# Patient Record
Sex: FEMALE | Race: BLACK OR AFRICAN AMERICAN | URBAN - METROPOLITAN AREA
[De-identification: names, ages, dates, MRNs, and addresses within clinical notes are randomized per-mention and may not be internally consistent; named-entity substitution may affect disease eponyms.]

---

## 2022-06-21 RX ORDER — INSULIN ASPART 100 [IU]/ML
INJECTION, SOLUTION INTRAVENOUS; SUBCUTANEOUS
COMMUNITY

## 2022-06-21 RX ORDER — FLUTICASONE PROPIONATE 50 MCG
SPRAY, SUSPENSION (ML) NASAL
COMMUNITY

## 2022-06-21 RX ORDER — POTASSIUM CHLORIDE 600 MG/1
TABLET, FILM COATED, EXTENDED RELEASE ORAL
COMMUNITY

## 2022-06-21 RX ORDER — TACROLIMUS 0.5 MG/1
CAPSULE ORAL
COMMUNITY

## 2022-06-21 RX ORDER — ROSUVASTATIN CALCIUM 40 MG/1
TABLET, COATED ORAL
COMMUNITY

## 2022-06-21 RX ORDER — AZELASTINE 1 MG/ML
SPRAY, METERED NASAL
COMMUNITY
Start: 2020-10-27

## 2022-06-21 RX ORDER — PANTOPRAZOLE SODIUM 40 MG/1
TABLET, DELAYED RELEASE ORAL
COMMUNITY
End: 2022-07-27

## 2022-06-21 RX ORDER — AMLODIPINE BESYLATE 10 MG/1
TABLET ORAL
COMMUNITY

## 2022-06-21 RX ORDER — MYCOPHENOLATE MOFETIL 250 MG/1
CAPSULE ORAL
COMMUNITY

## 2022-06-21 RX ORDER — TACROLIMUS 1 MG/1
1 CAPSULE ORAL
COMMUNITY

## 2022-06-21 RX ORDER — CARVEDILOL 3.12 MG/1
TABLET ORAL
COMMUNITY

## 2022-06-21 RX ORDER — LISINOPRIL 10 MG/1
TABLET ORAL
COMMUNITY

## 2022-06-21 RX ORDER — TRAMADOL HYDROCHLORIDE 50 MG/1
TABLET ORAL
COMMUNITY
Start: 2021-09-02

## 2022-06-21 RX ORDER — INSULIN LISPRO 100 [IU]/ML
INJECTION, SOLUTION INTRAVENOUS; SUBCUTANEOUS
COMMUNITY

## 2022-07-22 NOTE — PROGRESS NOTES
Mainor Williamson MD   Bariatric & Advanced Laparoscopic Surgery & Endoscopy  [facility], [address] [facility]  Mal[place] Harmon                                     Office [phone] Fax [phone]        Date of visit: 7/27/2022      History and Physical    Patient: Delores Bliss MRN: [id number]     YOB: 1955  Age: 61 y.o. Sex: female              PCP: No primary care provider on file. Date:  7/27/2022      Delores Bliss  who presents to the [facility] for consideration of bariatric surgery. This is her initial consultation preparing her for our multi-disciplinary surgical preparatory regimen. She presents with a height of 5' 5\" (1.651 m) and weight of 284 lb (128.8 kg), giving her a Body mass index is 47.26 kg/m². She has an Ideal body weight of 150 lbs, and excess body weight of 134 lbs. BARIATRIC PROCEDURE OF INTEREST: laparoscopic sleeve gastrectomy    30 day Bariatric Surgical Risk Percentage: 27.62%, 46.08%    MEDICAL HISTORY:  Morbid Obesity  A-fib, on anticoagulation  Hx of kidney transplant in 2020 at [address] - she has gained 80 lbs since her kidney transplant surgery. Hx of blood transfusion  Pacemaker    Comorbidity Yes or No   Hypertension- how many medications = 2 Yes   Hyperlipidemia No   Diabetes Mellitus  Insulin dependent = Yes  Last A1c = 7.1 Yes   Coronary Artery Disease Yes   Gastroesophageal Reflux No   Obstructive Sleep Apnea Yes   Cancer No   Asthma No   Osteoarthritis Yes   Joint Pain Yes     Admits to GERD symptoms historically, but has resolved recently due to changing her diet and is no longer on medication. Denies dysphagia or previous EGD. Other Yes or No   Venous Stasis No   Dialysis Yes, historically   Long term use of steroid or immunocompromised conditions Yes   On Anticoagulants Yes       DENTAL/CHEWING ABILITY:  No dental issues with chewing.      PRIOR WEIGHT LOSS ATTEMPTS:  Reports 4-10 previous weight loss attempts including OTC medication and low calorie diet and exercise. EXERCISE ASSESSMENT:  Reports current exercise regimen of going to the gym a couple times a week. Reviewed NIH guidelines. PSYCHOSOCIAL:  She notes she is single and states main support person is her friend, Tiffanie Liu. She is employed as a supervisor at [email or web address]. Her goal in pursuing surgical weight loss is to improve health. She denies any active psychological problems and denies history of depression/anxiety. She states she is independent in her care, can drive a car, and can ambulate without assistance. HISTORY:  No past medical history on file. She denies personal or family history of problems with anesthesia, bleeding, or clotting. She denies history of DVT or pulmonary embolism. She denies dysphagia. No past surgical history on file. No family history on file. MEDICATIONS:  Current Outpatient Medications   Medication Sig Dispense Refill    cetirizine (ZYRTEC) 10 MG tablet cetirizine 10 mg tablet   Take 1 tablet every day by oral route.       TRULICITY 1.5 ZD/8.2CS SOPN       JARDIANCE 10 MG tablet       furosemide (LASIX) 40 MG tablet TAKE 1 TABLET BY MOUTH TWICE DAILY      gabapentin (NEURONTIN) 300 MG capsule TAKE 1 CAPSULE BY MOUTH DAILY FOR 1 DAY      BAQSIMI ONE PACK 3 MG/DOSE POWD       NOVOLIN N FLEXPEN 100 UNIT/ML injection pen       B-D ULTRAFINE III SHORT PEN 31G X 8 MM MISC       itraconazole (SPORANOX) 100 MG capsule       potassium chloride (KLOR-CON M) 20 MEQ extended release tablet       predniSONE (DELTASONE) 5 MG tablet       NOVOLOG FLEXPEN 100 UNIT/ML injection pen       amLODIPine (NORVASC) 10 MG tablet TAKE ONE TABLET BY MOUTH EVERY DAY Orally Once a day      apixaban (ELIQUIS) 5 MG TABS tablet as directed Orally Twice a day      azelastine (ASTELIN) 0.1 % nasal spray 1 puff in each nostril Nasally Twice a day for 30 day(s)      carvedilol (COREG) 3.125 MG tablet 1 tablet Orally Twice a day      fluticasone (FLONASE) 50 MCG/ACT nasal spray 2 spray in each nostril Nasally as needed      insulin aspart (NOVOLOG) 100 UNIT/ML injection vial as directed Subcutaneous 8 units breakfast, lunch dinner for 90 days      insulin lispro (HUMALOG) 100 UNIT/ML SOLN injection vial as directed Subcutaneous 22 units QAM 8 units QPM for 90 days      lisinopril (PRINIVIL;ZESTRIL) 10 MG tablet 1 tablet Orally Once a day for 30 day(s)      mycophenolate (CELLCEPT) 250 MG capsule 2 capsules Orally Twice a day      potassium chloride (KLOR-CON) 8 MEQ extended release tablet 2 tablets with food Orally Twice a day for 30 day(s)      rosuvastatin (CRESTOR) 40 MG tablet TAKE ONE TABLET BY MOUTH ONCE DAILY. for 30 days      tacrolimus (PROGRAF) 0.5 MG capsule 2 capsules Orally Once a day      tacrolimus (PROGRAF) 1 MG capsule 1 capsule      traMADol (ULTRAM) 50 MG tablet 1 tablet as needed Orally every 6 hrs       No current facility-administered medications for this visit. ALLERGIES:  No Known Allergies    ROS: The patient has no difficulty with chest pain or shortness of breath. No fever or chills. Comprehensive 13 point review of systems was otherwise unremarkable except as noted above. Physical Exam:     BP (!) 163/76   Pulse 71   Ht 5' 5\" (1.651 m)   Wt 284 lb (128.8 kg)   BMI 47.26 kg/m²     General:  Well-developed, well-nourished, no distress. Psych:  Cooperative, good insight and judgement. Neuro:  Alert, oriented to person, place and time. HEENT:  Normocephalic, atraumatic. Sclera clear. Lungs:  Unlabored breathing. Symmetrical chest expansion. Chest wall:  No tenderness or deformity. Heart:  Regular rate and rhythm. No JVD. Abdomen:  Soft, non-tender, non-distended. No guarding or rebound. Extremities:  Extremities normal, atraumatic, no cyanosis or edema. Skin:  Skin color, texture, turgor normal. No rashes. ASSESSMENT:  Morbid obesity with a Body mass index is 47.26 kg/m².  beginning multi-disciplinary surgical prep program.    PLAN:  We have discussed the patient's participation and reviewed the steps in our preparatory program. She has had a long history of failed diet and exercise programs and has good understanding about the risks, benefits, and commitment related to bariatric surgery. She has attended our comprehensive educational seminar. She is interested in proceeding with our program seeking the laparoscopic sleeve gastrectomy. She drove from [facility] [place], [place] here to have a consultation for bariatric surgery because she is interested in robotic surgery. She reports she has talked to her kidney transplant physician and they have recommended she undergoes bariatric surgery. She tried to talk to the team at [address] but her insurance decline coverage. We discussed specifically she is very high risk for complications postoperatively including but not limited to gastric leak due to her immunosuppression and comorbidities. She verbalized understanding and still wants to proceed with sleeve gastrectomy. Diagnosis Orders   1. Morbid obesity (HCC)  Ferritin    Folate    Hemoglobin A1C    Iron    Nicotine and Metabolites    [facility]    Ambulatory referral to Physical Therapy    FL Upper GI W Barium Swallow Kub    TSH    Vitamin B12    Vitamin D 25 Hydroxy      2. Obesity, Class III, BMI 40-49.9 (morbid obesity) (HCC)  Ferritin    Folate    Hemoglobin A1C    Iron    Nicotine and Metabolites    Ambulatory referral to [address]    Ambulatory referral to Physical Therapy    FL Upper GI W Barium Swallow Kub    TSH    Vitamin B12    Vitamin D 25 Hydroxy      3. Hx of gastroesophageal reflux (GERD)  Ferritin    Folate    Hemoglobin A1C    Iron    Nicotine and Metabolites    Ambulatory referral to [address]    Ambulatory referral to Physical Therapy    FL Upper GI W Barium Swallow Kub    TSH    Vitamin B12    Vitamin D 25 Hydroxy      4.  Encounter for pre-operative laboratory testing  Ferritin    Folate    Hemoglobin A1C    Iron    Nicotine and Metabolites    Ambulatory referral to [address]    Ambulatory referral to Physical Therapy    FL Upper GI W Barium Swallow Kub    TSH    Vitamin B12    Vitamin D 25 Hydroxy      5. Type 2 diabetes mellitus without complication, with long-term current use of insulin (HCC)  Ferritin    Folate    Hemoglobin A1C    Iron    Nicotine and Metabolites    Ambulatory referral to [address]    Ambulatory referral to Physical Therapy    FL Upper GI W Barium Swallow Kub    TSH    Vitamin B12    Vitamin D 25 Hydroxy      6. Primary hypertension  Ferritin    Folate    Hemoglobin A1C    Iron    Nicotine and Metabolites    Ambulatory referral to [address]    Ambulatory referral to Physical Therapy    FL Upper GI W Barium Swallow Kub    TSH    Vitamin B12    Vitamin D 25 Hydroxy      7. TRINITY (obstructive sleep apnea)  Ferritin    Folate    Hemoglobin A1C    Iron    Nicotine and Metabolites    Ambulatory referral to [address]    Ambulatory referral to Physical Therapy    FL Upper GI W Barium Swallow Kub    TSH    Vitamin B12    Vitamin D 25 Hydroxy      8. Mixed hyperlipidemia        9. Atrial fibrillation, unspecified type ([facility] [address]        10. Pacemaker        11. History of kidney transplant        12. On continuous oral anticoagulation            1. Discussed in detail the Laparoscopic Luz Elena-en-Y Gastric Bypass and the Laparoscopic Sleeve Gastrectomy including the benefits, risks, and complications of each operation. The patient has a clear understanding of all operations. Also, discussed perioperative care and in-hospital and home care after each operation. The patient verbalized and demonstrated a clear understanding of what to expect. 2.  Patient is an excellent candidate with a clear medical necessity for bariatric surgery. 3.  Encouraged patient to participate in our Bariatric Support Group.     4.  Advised that weight loss surgery is only one part of a lifelong weight management program, and that sustainable weight loss will only come with major diet and behavioral changes. Advised that weight loss surgery requires a commitment to self-management and long-term follow up. 5.  Nutrition Recommendations:   Diet Rx - Low-moderate calorie intake (~1345-6958 kcal/day, based on 14-16kcal/kg ABW). Work on eating at least 3x/d with lean protein focus. 2 week pre-operative diet with caloric restriction of ~1200kcal/d.     6.  Exercise Recommendations: Exercise routine, incorporating both cardio and strength training, building up to 5x/wk for at least 30 minutes. Physical therapy evaluation for guidance on exercise routine. 7.  The assessment and plan of care were reviewed in detail with the patient and her questions were answered. She will complete the following steps:  1. Complete bariatric labs after dietitian evaluation. 2.  Participation in our behavior modification program, reduced calorie diet program, and participation in our exercise program recommendations supervised by our office. 3.  Complete our required psychological evaluation. 4.  Reminded of policy of no weight gain during prep period. 5.  Upper GI ordered  6. Physical Therapy Evaluation  7. Cardiology clearance - Pacemaker, a-fib  8. Transplant clearance - kidney transplant  9. Pulmonary clearance - COPD, TRINITY      She will return after the above are complete for assessment of her progress and schedule surgery. Time: I spent 60 minutes preparing to see patient (including chart review and preparation), obtaining and/or reviewing additional medical history, performing a physical exam and evaluation, documenting clinical information in the electronic health record, independently interpreting results, communicating results to patient, family or caregiver, and/or coordinating care.         Signed: Armond Patel MD  Bariatric & Minimally Invasive Surgery  7/27/2022

## 2022-07-27 ENCOUNTER — OFFICE VISIT (OUTPATIENT)
Dept: SURGERY | Age: 67
End: 2022-07-27
Payer: COMMERCIAL

## 2022-07-27 VITALS
BODY MASS INDEX: 47.32 KG/M2 | HEIGHT: 65 IN | SYSTOLIC BLOOD PRESSURE: 163 MMHG | DIASTOLIC BLOOD PRESSURE: 76 MMHG | HEART RATE: 71 BPM | WEIGHT: 284 LBS

## 2022-07-27 DIAGNOSIS — E66.01 OBESITY, CLASS III, BMI 40-49.9 (MORBID OBESITY) (HCC): ICD-10-CM

## 2022-07-27 DIAGNOSIS — Z01.812 ENCOUNTER FOR PRE-OPERATIVE LABORATORY TESTING: ICD-10-CM

## 2022-07-27 DIAGNOSIS — Z94.0 HISTORY OF KIDNEY TRANSPLANT: ICD-10-CM

## 2022-07-27 DIAGNOSIS — I48.91 ATRIAL FIBRILLATION, UNSPECIFIED TYPE (HCC): ICD-10-CM

## 2022-07-27 DIAGNOSIS — E66.01 MORBID OBESITY (HCC): Primary | ICD-10-CM

## 2022-07-27 DIAGNOSIS — E78.2 MIXED HYPERLIPIDEMIA: ICD-10-CM

## 2022-07-27 DIAGNOSIS — I10 PRIMARY HYPERTENSION: ICD-10-CM

## 2022-07-27 DIAGNOSIS — Z87.19 HX OF GASTROESOPHAGEAL REFLUX (GERD): ICD-10-CM

## 2022-07-27 DIAGNOSIS — Z79.01 ON CONTINUOUS ORAL ANTICOAGULATION: ICD-10-CM

## 2022-07-27 DIAGNOSIS — G47.33 OSA (OBSTRUCTIVE SLEEP APNEA): ICD-10-CM

## 2022-07-27 DIAGNOSIS — Z95.0 PACEMAKER: ICD-10-CM

## 2022-07-27 DIAGNOSIS — E11.9 TYPE 2 DIABETES MELLITUS WITHOUT COMPLICATION, WITH LONG-TERM CURRENT USE OF INSULIN (HCC): ICD-10-CM

## 2022-07-27 DIAGNOSIS — Z79.4 TYPE 2 DIABETES MELLITUS WITHOUT COMPLICATION, WITH LONG-TERM CURRENT USE OF INSULIN (HCC): ICD-10-CM

## 2022-07-27 PROBLEM — Z96.652 HISTORY OF TOTAL LEFT KNEE REPLACEMENT: Status: ACTIVE | Noted: 2022-07-27

## 2022-07-27 PROBLEM — M17.12 PRIMARY OSTEOARTHRITIS OF LEFT KNEE: Status: ACTIVE | Noted: 2022-07-27

## 2022-07-27 PROBLEM — E78.5 HYPERLIPIDEMIA: Status: ACTIVE | Noted: 2022-07-27

## 2022-07-27 PROBLEM — I42.0 NONISCHEMIC CONGESTIVE CARDIOMYOPATHY (HCC): Status: ACTIVE | Noted: 2018-08-20

## 2022-07-27 PROBLEM — J44.9 CHRONIC OBSTRUCTIVE PULMONARY DISEASE (HCC): Status: ACTIVE | Noted: 2022-07-27

## 2022-07-27 PROBLEM — N18.4 CHRONIC KIDNEY DISEASE, STAGE 4 (SEVERE) (HCC): Status: ACTIVE | Noted: 2022-07-27

## 2022-07-27 PROBLEM — J30.9 ALLERGIC RHINITIS: Status: ACTIVE | Noted: 2022-07-27

## 2022-07-27 PROBLEM — M54.50 LOW BACK PAIN: Status: ACTIVE | Noted: 2022-07-27

## 2022-07-27 PROBLEM — M25.562 PAIN OF LEFT PATELLOFEMORAL JOINT: Status: ACTIVE | Noted: 2022-07-27

## 2022-07-27 PROBLEM — M54.16 LUMBAR RADICULOPATHY: Status: ACTIVE | Noted: 2022-07-27

## 2022-07-27 PROBLEM — D64.9 ANEMIA: Status: ACTIVE | Noted: 2018-08-20

## 2022-07-27 PROBLEM — D50.9 IRON DEFICIENCY ANEMIA: Status: ACTIVE | Noted: 2022-07-27

## 2022-07-27 PROBLEM — Z95.810 IMPLANTABLE CARDIOVERTER-DEFIBRILLATOR (ICD) IN SITU: Status: ACTIVE | Noted: 2018-08-20

## 2022-07-27 PROBLEM — E66.9 OBESITY: Status: ACTIVE | Noted: 2022-07-27

## 2022-07-27 PROCEDURE — 99205 OFFICE O/P NEW HI 60 MIN: CPT | Performed by: SURGERY

## 2022-07-27 PROCEDURE — APPSS45 APP SPLIT SHARED TIME 31-45 MINUTES: Performed by: PHYSICIAN ASSISTANT

## 2022-07-27 RX ORDER — INSULIN ASPART 100 [IU]/ML
INJECTION, SOLUTION INTRAVENOUS; SUBCUTANEOUS
COMMUNITY
Start: 2022-07-20

## 2022-07-27 RX ORDER — GABAPENTIN 300 MG/1
CAPSULE ORAL
COMMUNITY
Start: 2022-06-20

## 2022-07-27 RX ORDER — GLUCAGON 3 MG/1
POWDER NASAL
COMMUNITY
Start: 2022-04-25

## 2022-07-27 RX ORDER — LISINOPRIL 40 MG/1
TABLET ORAL
COMMUNITY
Start: 2022-06-20 | End: 2022-07-27

## 2022-07-27 RX ORDER — DULAGLUTIDE 1.5 MG/.5ML
INJECTION, SOLUTION SUBCUTANEOUS
COMMUNITY
Start: 2022-06-29 | End: 2022-09-01

## 2022-07-27 RX ORDER — POTASSIUM CHLORIDE 20 MEQ/1
TABLET, EXTENDED RELEASE ORAL
COMMUNITY
Start: 2022-06-29

## 2022-07-27 RX ORDER — EMPAGLIFLOZIN 10 MG/1
TABLET, FILM COATED ORAL
COMMUNITY
Start: 2022-07-25

## 2022-07-27 RX ORDER — PREDNISONE 1 MG/1
TABLET ORAL
COMMUNITY
Start: 2022-06-23

## 2022-07-27 RX ORDER — ITRACONAZOLE 100 MG/1
CAPSULE ORAL
COMMUNITY
Start: 2022-07-11

## 2022-07-27 RX ORDER — FUROSEMIDE 40 MG/1
TABLET ORAL
COMMUNITY
Start: 2022-06-20

## 2022-07-27 RX ORDER — HUMAN INSULIN 100 [IU]/ML
INJECTION, SUSPENSION SUBCUTANEOUS
COMMUNITY
Start: 2022-07-18

## 2022-07-27 RX ORDER — PEN NEEDLE, DIABETIC 31 GX5/16"
NEEDLE, DISPOSABLE MISCELLANEOUS
COMMUNITY
Start: 2022-05-10

## 2022-07-27 RX ORDER — CETIRIZINE HYDROCHLORIDE 10 MG/1
TABLET ORAL
COMMUNITY

## 2022-08-08 DIAGNOSIS — Z01.818 PRE-OP EVALUATION: Primary | ICD-10-CM

## 2022-08-23 NOTE — PROGRESS NOTES
Salazar Drake, MS, RD, LD  Surgical Weight Loss Dietitian  [address] [facility], [address] [place] [address]  Malou Henderson  Phone [phone]   Fax [phone]    [facility] INITIAL ASSESSMENT    Nutrition Assessment:  Anthropometrics:    Ht: 55, wt: 283#, 189% IBW, 47.09 BMI  Pt has lost 1 lbs since last visit. Labs:   No results found for: HBA1C, VITD25, NLIT86KI, FOLATE, IRON, FERRITIN, TSH     Macronutrient needs assessment   EER: 8050-1186 kcal/d (14-16 kcal/kg ABW)    MSJ x 1.3-500 = 1888 kcal/d (sedentary AF)   EPR: 68-102g pro (1.0-1.5 gm pro/kg IBW)   CHO: ~236 g CHO/d (50% EER, ~5 servings CHO/meal)   H2O: 1mL/kcal or per MD recommendations     Support:  Pt has told sisters, friend - to be good support. Reminded pt about SWL support group and online support group. Motivation:  High. Onset of obesity: Adulthood   Potential triggers to weight gain: cooking/baking as a hobby (tasting lots of baked goods), sweet tooth, corticosteroids secondary to kidney transplant   Hx of obesity in family members include sister, mother, father. History of Weight loss attempts, goal >10# weight loss:    Prior bariatric programs: none    Commercial programs: none    Registered Dietitian visits: yes, for weight loss/cardiac rehab    Medical Weight Loss programs, including medications: yes, Mike medication OTC    Self-supervised diets and exercise: moderate exercise, low-calorie, low-carb   Pt has attempted weight loss via modalities listed above - ~8+ attempts made, all without any permanent weight loss. Pt goal in pursuing SWL is better control of diet intake with surgery tool, weight loss, better health.       Eating Habits:   Eating occasions/d: 3/day  Main cook at home: pt  Restaurant/Fast Food Intake: 1-2/month  Food Allergies: none  Cultural Preferences: none  Typical Beverage Consumption: water, dr. Niraj Stallings 3x/month  Diet Recall:   Breakfast: watermelon, smoothie with yogurt  Lunch: pork chop  Dinner: shrimp carlie  Beverages: water  Feeling before eating meals:  Slightly hungry   Feeling after eating meals: Comfortable  Habits:    Eating mindfully: in practice    Drinking after meals: practicing, currently waiting 15 minutes    Elimination of sugary/carbonated/caffeinated/alcoholic beverages: in practice, still drinking caffeinated/carbonated rarely    Drinking without straws: sometimes    Lifestyle Assessment:    Hours of sleep/night: ~8-9   Current Occupation: , work from home   Activity during day: moderate   Number of people living in house and influence: pt only    Exercise Assessment:   PAR-Q: yes, modified for cardiac rehab  Medical:     Pain or injuries (present): yes - some back pain    Past surgeries related to mobility: bilateral knee replacements, back  Exercise equipment at home: treadmill, exercise ball, weights   Gym Membership: yes - [employer]  Current Exercise Routine: treadmill and weights for 60 minutes 3-5d/week  Assessment Exercise Goal: treadmill and weights for 60 minutes 3-5d/week and increase intensity as tolerated    Nutrition Diagnosis:  Morbid obesity R/T excessive energy intake and yoyo dieting as evidenced by BMI = 47.09 and 189 % IBW. Nutrition Intervention:   Diet Rx - Low-moderate calorie intake (~1900 kcal/day). Work on eating 3 meals/d and meal balance. Modify distribution, type or amount of food and nutrients within meals or at a specified time-    Discussed need for 3 meals per day and snacks based on body size. Explained macronutrients and emphasized mindful eating behaviors. Discussed meal priority and encouraged practice. Discussed effect of sugary/carbonate/caffeinated beverages on the body and the need for eventual elimination. Pt goal to increasing hydrating fluid intake. Educated on mindful eating and meal priority and encouraged practice. *Pt verbalizes understanding of information provided during this session.      Nutrition Monitoring and Evaluation:   Monitor for understanding of diet and exercise rx. Follow up for practicing mindful eating behaviors and meal priority. Follow for elimination of sugary, carbonated, caffeinated beverages.    F/U 3 weeks      Purnima Steven MS, RD, LD

## 2022-08-24 NOTE — PROGRESS NOTES
Gokul Martinez PA-C  Bariatric & Advanced Laparoscopic Surgery & Endoscopy  [employer] [address] [facility]  [place]  Phone [phone]   Fax [phone]    Date of visit: 2022      Primary/Requesting provider: None Provider         Name: Ethel Mayer      MRN: [id number]       : 1955       Age: 61 y.o. Sex: female        PCP: None Provider     CC:  Bariatric monthly dietary follow up    Surgeon: Dr. Charli Atkins    Procedure: laparoscopic sleeve gastrectomy    Surgical Consult Date: 2022    The patient is a 61 y.o. female presenting with a height of 5' 5\" (1.651 m) and weight of 283 lb (128.4 kg), giving her a Body mass index is 47.09 kg/m². She has an ideal body weight of 150 lbs, and excess body weight of 133 lbs. She started our program with a weight of 284 lbs, lost 1lbs. She is in the process of completing all aspects of our prep program and to be deemed an acceptable candidate for bariatric surgery. Patient has a long term history of obesity with multiple failed attempts at weight reduction. Patient denies any changes in health history or physical health since last visit. Patient has been adherent to her diet and exercise regimen. Patient feels that she is well informed regarding her bariatric surgery choice and would like to proceed with a laparoscopic sleeve gastrectomy for weight reduction, improvement of her medical conditions, and improved quality of life. Patient verbalized understanding of the risks associated with bariatric surgery. Patient also verbalized understanding that bariatric surgery is a tool and that weight reduction is dependent on behavioral changes in regards to what she eats and how much.     NUTRITION ASSESSMENT:   Diet Recall:   Breakfast: watermelon, smoothie with yogurt  Lunch: pork chop  Dinner: shrimp carlie  Beverages: water  Habits:   Eating 3x/day: yes  Elimination of caffeine and carbonated beverages: working on this,   Practicing not drinking with meals: yes     EXERCISE ASSESSMENT: Pt is currently exercising via treadmill and weights for 60 minutes 3-5d/week. NIH Guidelines reviewed with pt. PSYCHOLOGICAL EVALUATION:  Scheduled, 09/26/2022 with Sandro Tabor  DIETITIMARCUS EVALUATION:  In progress with Piotr Booth RD, LD  BARIATRIC LABS:  Completed, 09/01/2022   CARDIAC CLEARANCE:  Scheduled, 09/07/202222  PULMONARY CLEARANCE:  Scheduled, 09/26/2022  TRANSPLANT CLEARANCE: Completed, 08/16/2022  UPPER GI:  Completed, 08/11/2022  Moderate-sized sliding-type hiatal hernia without evidence of gastroesophageal   reflux. Mild esophageal dysmotility with distal esophageal tertiary   contractions. Otherwise, unremarkable double contrast esophagram and upper GI   examination as detailed above. PHYSICAL THERAPY EVALUATION FOR MOBILITY OPTIMIZATION:  Completed    MEDICAL HISTORY:  Morbid Obesity  A-fib, on anticoagulation  Hx of kidney transplant in 2020 at [address] - she has gained 80 lbs since her kidney transplant surgery. Hx of blood transfusion  Pacemaker     Comorbidity Yes or No   Hypertension- how many medications = 2 Yes   Hyperlipidemia No   Diabetes Mellitus  Insulin dependent = Yes  Last A1c = 7.1 Yes   Coronary Artery Disease Yes   Gastroesophageal Reflux No   Obstructive Sleep Apnea Yes   Cancer No   Asthma No   Osteoarthritis Yes   Joint Pain Yes      Admits to GERD symptoms historically, but has resolved recently due to changing her diet and is no longer on medication. Denies dysphagia or previous EGD. Other Yes or No   Venous Stasis No   Dialysis Yes, historically   Long term use of steroid or immunocompromised conditions Yes   On Anticoagulants Yes      PMH:  No past medical history on file. PSH:  No past surgical history on file.     MEDS:  Current Outpatient Medications   Medication Sig Dispense Refill    cetirizine (ZYRTEC) 10 MG tablet cetirizine 10 mg tablet   Take 1 tablet every day by oral route.      TRULICITY 1.5 VA/9.3OH SOPN       JARDIANCE 10 MG tablet       furosemide (LASIX) 40 MG tablet TAKE 1 TABLET BY MOUTH TWICE DAILY      gabapentin (NEURONTIN) 300 MG capsule TAKE 1 CAPSULE BY MOUTH DAILY FOR 1 DAY      BAQSIMI ONE PACK 3 MG/DOSE POWD       NOVOLIN N FLEXPEN 100 UNIT/ML injection pen       B-D ULTRAFINE III SHORT PEN 31G X 8 MM MISC       itraconazole (SPORANOX) 100 MG capsule       potassium chloride (KLOR-CON M) 20 MEQ extended release tablet       predniSONE (DELTASONE) 5 MG tablet       NOVOLOG FLEXPEN 100 UNIT/ML injection pen       amLODIPine (NORVASC) 10 MG tablet TAKE ONE TABLET BY MOUTH EVERY DAY Orally Once a day      apixaban (ELIQUIS) 5 MG TABS tablet as directed Orally Twice a day      azelastine (ASTELIN) 0.1 % nasal spray 1 puff in each nostril Nasally Twice a day for 30 day(s)      carvedilol (COREG) 3.125 MG tablet 1 tablet Orally Twice a day      fluticasone (FLONASE) 50 MCG/ACT nasal spray 2 spray in each nostril Nasally as needed      insulin aspart (NOVOLOG) 100 UNIT/ML injection vial as directed Subcutaneous 8 units breakfast, lunch dinner for 90 days      insulin lispro (HUMALOG) 100 UNIT/ML SOLN injection vial as directed Subcutaneous 22 units QAM 8 units QPM for 90 days      lisinopril (PRINIVIL;ZESTRIL) 10 MG tablet 1 tablet Orally Once a day for 30 day(s)      mycophenolate (CELLCEPT) 250 MG capsule 2 capsules Orally Twice a day      potassium chloride (KLOR-CON) 8 MEQ extended release tablet 2 tablets with food Orally Twice a day for 30 day(s)      rosuvastatin (CRESTOR) 40 MG tablet TAKE ONE TABLET BY MOUTH ONCE DAILY. for 30 days      tacrolimus (PROGRAF) 0.5 MG capsule 2 capsules Orally Once a day      tacrolimus (PROGRAF) 1 MG capsule 1 capsule      traMADol (ULTRAM) 50 MG tablet 1 tablet as needed Orally every 6 hrs       No current facility-administered medications for this visit.        ALLERGIES:    No Known Allergies    SH:       FH:  No family history on file. Physical Exam:     BP (!) 149/79   Pulse 77   Ht 5' 5\" (1.651 m)   Wt 283 lb (128.4 kg)   BMI 47.09 kg/m²     General:  Well-developed, well-nourished, no distress. Psych:  Cooperative, good insight and judgement. Neuro:  Alert, oriented to person, place and time. Lungs:  Unlabored breathing. Symmetrical chest expansion. Heart:  Regular rate and rhythm. Abdomen:  Soft, obese, non-tender, non-distended. No guarding or rebound. No palpable masses. Labs: All recent labs were reviewed. No results found for: LABA1C  No results found for: TSHFT4, TSH   No results found for: VITD25   No results found for: IJJZXUYF26   No results found for: IRON, TIBC, FERRITIN     Imaging: All images were independently reviewed by me. Diagnosis Orders   1. Morbid obesity ([address]        2. Obesity, Class III, BMI 40-49.9 (morbid obesity) ([address]        3. Hx of gastroesophageal reflux (GERD)        4. Type 2 diabetes mellitus without complication, with long-term current use of insulin ([address]        5. Primary hypertension        6. TRINITY (obstructive sleep apnea)        7. Mixed hyperlipidemia        8. Atrial fibrillation, unspecified type ([address]        9. Pacemaker        10. History of kidney transplant        11. On continuous oral anticoagulation        12. Dietary counseling        13. Exercise counseling            Assessment/Plan:    Luigi Keys is a 61 y.o. female is here for monthly follow-up visit prior to bariatric surgery with medical co-morbidities related to morbid obesity. Patient is a fair candidate for bariatric surgery and meets NIH criteria for weight loss surgery. In detail, discussed risks and benefits of laparoscopic sleeve gastrectomy. Reviewed information and expectations regarding the pre-operative period, the bariatric procedure, and postoperative period.  Stressed with patient importance of understanding bariatric surgery is a tool and will not work if patient does not continue with healthy lifestyle changes including regular exercise and high protein, low calorie, low carb diet. Patient was seen by the dietitian today for continued evaluation and management regarding lifestyle and dietary changes. Reviewed assessment and plan in detail. Patient verbalized understanding. Questions answered. Recommendations: Continue to work on dietary changes over the next couple of weeks, follow up in three weeks. Complete remaining work up requirements including cardiology clearance, pulmonary clearance, and psychology appointments. Kurt Rogers PA-C  Bariatric Surgery  9/1/2022    Counseling time:counseling time more than 50% of visit: 45 minutes: I spent this time preparing to see patient (including chart review and preparation), obtaining and/or reviewing additional medical history, performing a physical exam and evaluation, documenting clinical information in the electronic health record, independently interpreting results, communicating results to patient, family or caregiver, and/or coordinating care.

## 2022-09-01 ENCOUNTER — OFFICE VISIT (OUTPATIENT)
Dept: SURGERY | Age: 67
End: 2022-09-01
Payer: COMMERCIAL

## 2022-09-01 VITALS
WEIGHT: 283 LBS | HEIGHT: 65 IN | HEART RATE: 77 BPM | DIASTOLIC BLOOD PRESSURE: 79 MMHG | SYSTOLIC BLOOD PRESSURE: 149 MMHG | BODY MASS INDEX: 47.15 KG/M2

## 2022-09-01 DIAGNOSIS — Z79.01 ON CONTINUOUS ORAL ANTICOAGULATION: ICD-10-CM

## 2022-09-01 DIAGNOSIS — E66.01 MORBID OBESITY (HCC): ICD-10-CM

## 2022-09-01 DIAGNOSIS — Z94.0 HISTORY OF KIDNEY TRANSPLANT: ICD-10-CM

## 2022-09-01 DIAGNOSIS — Z71.82 EXERCISE COUNSELING: ICD-10-CM

## 2022-09-01 DIAGNOSIS — I10 PRIMARY HYPERTENSION: ICD-10-CM

## 2022-09-01 DIAGNOSIS — E66.01 OBESITY, CLASS III, BMI 40-49.9 (MORBID OBESITY) (HCC): ICD-10-CM

## 2022-09-01 DIAGNOSIS — Z71.3 DIETARY COUNSELING: ICD-10-CM

## 2022-09-01 DIAGNOSIS — Z79.4 TYPE 2 DIABETES MELLITUS WITHOUT COMPLICATION, WITH LONG-TERM CURRENT USE OF INSULIN (HCC): ICD-10-CM

## 2022-09-01 DIAGNOSIS — G47.33 OSA (OBSTRUCTIVE SLEEP APNEA): ICD-10-CM

## 2022-09-01 DIAGNOSIS — E66.01 MORBID OBESITY (HCC): Primary | ICD-10-CM

## 2022-09-01 DIAGNOSIS — Z87.19 HX OF GASTROESOPHAGEAL REFLUX (GERD): ICD-10-CM

## 2022-09-01 DIAGNOSIS — I48.91 ATRIAL FIBRILLATION, UNSPECIFIED TYPE (HCC): ICD-10-CM

## 2022-09-01 DIAGNOSIS — Z01.812 ENCOUNTER FOR PRE-OPERATIVE LABORATORY TESTING: ICD-10-CM

## 2022-09-01 DIAGNOSIS — E11.9 TYPE 2 DIABETES MELLITUS WITHOUT COMPLICATION, WITH LONG-TERM CURRENT USE OF INSULIN (HCC): ICD-10-CM

## 2022-09-01 DIAGNOSIS — E78.2 MIXED HYPERLIPIDEMIA: ICD-10-CM

## 2022-09-01 DIAGNOSIS — Z95.0 PACEMAKER: ICD-10-CM

## 2022-09-01 PROBLEM — M17.9 OSTEOARTHRITIS OF KNEE: Status: ACTIVE | Noted: 2022-07-27

## 2022-09-01 PROBLEM — G47.30 SLEEP APNEA: Status: ACTIVE | Noted: 2022-07-27

## 2022-09-01 PROBLEM — I25.10 ATHEROSCLEROSIS OF CORONARY ARTERY: Status: ACTIVE | Noted: 2022-09-01

## 2022-09-01 PROBLEM — N18.9 CHRONIC KIDNEY DISEASE: Status: ACTIVE | Noted: 2022-07-27

## 2022-09-01 LAB
25(OH)D3 SERPL-MCNC: 29.3 NG/ML (ref 30–100)
EST. AVERAGE GLUCOSE BLD GHB EST-MCNC: 177 MG/DL
FERRITIN SERPL-MCNC: 348 NG/ML (ref 8–388)
FOLATE SERPL-MCNC: 28.4 NG/ML (ref 3.1–17.5)
HBA1C MFR BLD: 7.8 % (ref 4.8–5.6)
IRON SERPL-MCNC: 73 UG/DL (ref 35–150)
TSH, 3RD GENERATION: 2.01 UIU/ML (ref 0.36–3.74)
VIT B12 SERPL-MCNC: 541 PG/ML (ref 193–986)

## 2022-09-01 PROCEDURE — 99215 OFFICE O/P EST HI 40 MIN: CPT | Performed by: PHYSICIAN ASSISTANT

## 2022-09-01 RX ORDER — TACROLIMUS 0.75 MG/1
TABLET, EXTENDED RELEASE ORAL
COMMUNITY
Start: 2022-06-29

## 2022-09-01 RX ORDER — ERGOCALCIFEROL 1.25 MG/1
CAPSULE ORAL
COMMUNITY
Start: 2022-08-05

## 2022-09-01 RX ORDER — PANTOPRAZOLE SODIUM 40 MG/1
TABLET, DELAYED RELEASE ORAL
COMMUNITY
Start: 2022-08-02

## 2022-09-01 RX ORDER — DULAGLUTIDE 3 MG/.5ML
INJECTION, SOLUTION SUBCUTANEOUS
COMMUNITY
Start: 2022-08-24

## 2022-09-06 LAB
COTININE SERPL-MCNC: <1 NG/ML
NICOTINE SERPL-MCNC: <1 NG/ML

## 2022-09-26 ENCOUNTER — TELEPHONE (OUTPATIENT)
Dept: BEHAVIORAL/MENTAL HEALTH CLINIC | Facility: CLINIC | Age: 67
End: 2022-09-26

## 2022-09-26 NOTE — TELEPHONE ENCOUNTER
SW Note  The pt did not show for her appointment for her bariatric psych eval.  This SW called her to alert her to this. She was not available and was unable to leave a .